# Patient Record
Sex: MALE | Race: WHITE | NOT HISPANIC OR LATINO | Employment: OTHER | RURAL
[De-identification: names, ages, dates, MRNs, and addresses within clinical notes are randomized per-mention and may not be internally consistent; named-entity substitution may affect disease eponyms.]

---

## 2022-12-12 ENCOUNTER — HOSPITAL ENCOUNTER (EMERGENCY)
Facility: HOSPITAL | Age: 32
Discharge: HOME | End: 2022-12-12

## 2022-12-12 VITALS
BODY MASS INDEX: 35.5 KG/M2 | OXYGEN SATURATION: 98 % | WEIGHT: 248 LBS | HEART RATE: 97 BPM | RESPIRATION RATE: 20 BRPM | TEMPERATURE: 98 F | HEIGHT: 70 IN | DIASTOLIC BLOOD PRESSURE: 79 MMHG | SYSTOLIC BLOOD PRESSURE: 132 MMHG

## 2022-12-12 DIAGNOSIS — H60.501 ACUTE OTITIS EXTERNA OF RIGHT EAR, UNSPECIFIED TYPE: ICD-10-CM

## 2022-12-12 DIAGNOSIS — T16.1XXA FOREIGN BODY OF RIGHT EAR, INITIAL ENCOUNTER: Primary | ICD-10-CM

## 2022-12-12 DIAGNOSIS — H61.21 IMPACTED CERUMEN OF RIGHT EAR: ICD-10-CM

## 2022-12-12 PROCEDURE — 99283 PR EMERGENCY DEPT VISIT,LEVEL III: ICD-10-PCS | Mod: ,,, | Performed by: NURSE PRACTITIONER

## 2022-12-12 PROCEDURE — 99283 EMERGENCY DEPT VISIT LOW MDM: CPT

## 2022-12-12 PROCEDURE — 99283 EMERGENCY DEPT VISIT LOW MDM: CPT | Mod: ,,, | Performed by: NURSE PRACTITIONER

## 2022-12-12 RX ORDER — NEOMYCIN SULFATE, POLYMYXIN B SULFATE AND HYDROCORTISONE 10; 3.5; 1 MG/ML; MG/ML; [USP'U]/ML
3 SUSPENSION/ DROPS AURICULAR (OTIC) 3 TIMES DAILY
Qty: 15 ML | Refills: 0 | Status: SHIPPED | OUTPATIENT
Start: 2022-12-12

## 2022-12-12 RX ORDER — NEOMYCIN SULFATE, POLYMYXIN B SULFATE AND HYDROCORTISONE 10; 3.5; 1 MG/ML; MG/ML; [USP'U]/ML
3 SUSPENSION/ DROPS AURICULAR (OTIC) 3 TIMES DAILY
Qty: 15 ML | Refills: 0 | Status: SHIPPED | OUTPATIENT
Start: 2022-12-12 | End: 2022-12-12 | Stop reason: SDUPTHER

## 2022-12-13 NOTE — ED PROVIDER NOTES
Encounter Date: 12/12/2022       History     Chief Complaint   Patient presents with    Foreign Body in Ear     32 yr old reports moth flew in ear earlier, states they irrigated at home and now he feels like the ear is stopped up.      The history is provided by the patient.   Foreign Body   The current episode started just prior to arrival. The foreign body is suspected to be in the right ear. The incident was witnessed. The incident was witnessed/reported by The patient. He has been Behaving normally.   Review of patient's allergies indicates:  No Known Allergies  History reviewed. No pertinent past medical history.  History reviewed. No pertinent surgical history.  History reviewed. No pertinent family history.  Social History     Tobacco Use    Smoking status: Never    Smokeless tobacco: Never   Substance Use Topics    Alcohol use: Never    Drug use: Never     Review of Systems   Constitutional: Negative.    HENT:  Positive for ear pain.    Respiratory: Negative.     Cardiovascular: Negative.    Skin: Negative.      Physical Exam     Initial Vitals [12/12/22 2131]   BP Pulse Resp Temp SpO2   132/79 97 20 98.2 °F (36.8 °C) 98 %      MAP       --         Physical Exam    Nursing note and vitals reviewed.  Constitutional: He appears well-developed and well-nourished.   HENT:   Right Ear: There is swelling and tenderness. A foreign body is present.   Canal with scratch and redness, noted white object removed from canal.  Also noted cerumen near TM.     Cardiovascular:  Normal rate and regular rhythm.           Pulmonary/Chest: Breath sounds normal.     Neurological: He is alert and oriented to person, place, and time. GCS score is 15. GCS eye subscore is 4. GCS verbal subscore is 5. GCS motor subscore is 6.       Medical Screening Exam   See Full Note    ED Course   Procedures  Labs Reviewed - No data to display       Imaging Results    None          Medications - No data to display  Medical Decision Making:    Initial Assessment:   Reports moth in ear  Differential Diagnosis:   FB ear  Cerumen impaction  ED Management:  Removed white object from ear, RX debrox for cerumen impaction.                  Clinical Impression:   Final diagnoses:  [T16.1XXA] Foreign body of right ear, initial encounter (Primary)  [H61.21] Impacted cerumen of right ear  [H60.501] Acute otitis externa of right ear, unspecified type        ED Disposition Condition    Discharge Stable          ED Prescriptions       Medication Sig Dispense Start Date End Date Auth. Provider    neomycin-polymyxin-hydrocortisone (CORTISPORIN) 3.5-10,000-1 mg/mL-unit/mL-% otic suspension  (Status: Discontinued) Place 3 drops into both ears 3 (three) times daily. 15 mL 12/12/2022 12/12/2022 SUE Storey    carbamide peroxide (DEBROX) 6.5 % otic solution  (Status: Discontinued) Place 5 drops into both ears as needed (cerumen impaction). 15 mL 12/12/2022 12/12/2022 SUE Storey    carbamide peroxide (DEBROX) 6.5 % otic solution Place 5 drops into both ears as needed (cerumen impaction). 15 mL 12/12/2022 -- SUE Storey    neomycin-polymyxin-hydrocortisone (CORTISPORIN) 3.5-10,000-1 mg/mL-unit/mL-% otic suspension Place 3 drops into both ears 3 (three) times daily. 15 mL 12/12/2022 -- SUE Storey          Follow-up Information       Follow up With Specialties Details Why Contact Info      In 1 week               SUE Storey  12/12/22 5010

## 2022-12-13 NOTE — ED NOTES
Discharge instructions given and explained to pt and he verbalized his understanding. Pt is stable and states he is feeling better. Pt's wife is here to drive pt home.